# Patient Record
(demographics unavailable — no encounter records)

---

## 2024-11-03 NOTE — ASSESSMENT
[FreeTextEntry1] : 86 y/o F- moderate/severe AS and stage 1 DD, HTN, diverticulitis, total right knee replacement, ovarian Ca 40 yrs ago s/p DAQUAN, osteopenia, BL cataract surgery, recent episode of shingles who presents here to establish care  #Shingles Her rash has resolved and significant pain, s/p course of valtrex - Stop Gabapentin - Stop Motrin - c/w lidocaine patches  #AS Patient with moderate to severe AS on TTE, was following with outside cardiologist, sonia, likely unrelated to her falls - Will plan on referring to cardiology for surveillance and possible TAVR eval - Can discontinue ASA for primary prevention  #HTN On atenelol 25- patient hypertensive and stanislaw to 50s on this visit - medication changed to Losartan 25mg daily and advised to monitor BP at home   #Osteopenia Patient reports she had osteopenia on prior DEXA "Many years ago" - Repeat DEXA - Vit D level  #Hearing/vision issues - Audiology and optometry referrals today  #Falls Seem to be mechanical, dont believe they are cardiac in nature - Educated family on how to implement fall safety measures  - May benefit from assistance devices or PT eval in future - Will attempt to optimize vision and hearing

## 2024-11-03 NOTE — REASON FOR VISIT
[Follow-Up] : a follow-up visit [Family Member] : family member [FreeTextEntry1] : dizziness and elevated blood pressure

## 2024-11-03 NOTE — HISTORY OF PRESENT ILLNESS
[Patient reported osteoporosis screening was abnormal] : Patient reported osteoporosis screening was abnormal [Patient reported hearing was abnormal] : Patient reported hearing was abnormal [Patient reported vision is abnormal] : Patient reported vision is abnormal [Two or more falls in past year] : Patient reported two or more falls in the past year [Patient is independent with] : bathing [] : managing medications [Independent] : managing finances [0] : 2) Feeling down, depressed, or hopeless: Not at all (0) [PHQ-2 Negative - No further assessment needed] : PHQ-2 Negative - No further assessment needed [FreeTextEntry1] : 86 y/o F from home lives alone, accompanied by her daughter Jocelyne [phone]. She has Hx of moderate/severe AS and stage 1 DD, HTN, diverticulitis, total right knee replacement, ovarian Ca 40 yrs ago s/p DAQUAN, osteopenia, BL cataract surgery, recent episode of shingles.  - Accompanied by daughter, Raquel, who states that she has been somewhat more forgetful recently and is still agitated, however is completing all her ADLs still - Patient has had 3 mechanical falls in past year- denies syncope or falls after exertion - Followed with a private cardiologist for AS- mod-severe based off prior TTE in 2023, also with grade 1 DD - Was seen at  one month ago for shingles, has never had it before, given 7 days of Valtrex and topical lidocaine and gabapentin, also taking motrin, states gabapentin makes her dizzy. Her rash has resolved but continues to have lingering pain, she is applying topical calamine. She has not received shingles vaccine in past - Reports issues with vision and hearing. She requests an ophthalmology referral.   - Patient still pretty active, goes on frequent walks and still dances. She also attends senior center at [place].  [Smoke Detector] : no smoke detector [Stair Lift] : no stair lift used in home [Grab Bars] : no grab bars [Shower Chair] : no shower chair [Driving Concerns] : not driving or driving without noted concerns [Juliann] : 9 seconds

## 2024-11-03 NOTE — REVIEW OF SYSTEMS
[Feeling Poorly] : feeling poorly [Feeling Tired] : feeling tired [Skin Lesions] : skin lesion [Recent Weight Gain (___ Lbs)] : no recent weight gain [Recent Weight Loss (___ Lbs)] : no recent weight loss [Chest Pain] : no chest pain [Palpitations] : no palpitations [Lower Ext Edema] : no lower extremity edema [Shortness Of Breath] : no shortness of breath [Wheezing] : no wheezing [Cough] : no cough [SOB on Exertion] : no shortness of breath during exertion [Abdominal Pain] : no abdominal pain [Constipation] : no constipation [Dysuria] : no dysuria [Incontinence] : no incontinence [Dizziness] : no dizziness [Fainting] : no fainting [Difficulty Walking] : no difficulty walking [Anxiety] : no anxiety [Depression] : no depression

## 2024-11-03 NOTE — PHYSICAL EXAM
[Alert] : alert [Sclera] : the sclera and conjunctiva were normal [EOMI] : extraocular movements were intact [Normal Oral Mucosa] : normal oral mucosa [No Oral Cyanosis] : no oral cyanosis [Normal Outer Ear/Nose] : the ears and nose were normal in appearance [Oropharynx] : the oropharynx was normal [Normal Appearance] : the appearance of the neck was normal [Normal] : no respiratory distress, no accessory muscle use, normal respiratory rhythm and effort, lungs were clear to auscultation bilaterally [Normal S1, S2] : normal S1 and S2 [Heart Rate And Rhythm] : heart rate was normal and rhythm regular [Edema] : edema was not present [Bowel Sounds] : normal bowel sounds [Abdomen Tenderness] : non-tender [Abdomen Soft] : soft [No Focal Deficits] : no focal deficits [Oriented To Time, Place, And Person] : oriented to person, place, and time [Normal Mood] : the mood was normal [JVD] : there was jugular-venous distention [de-identified] : BL cerumen impaction  [de-identified] : loud systolic murmur at RUSB [de-identified] : Dermatomal rash with overlying dried flaky ointment over the right neck/shoulder in dermatomal fashion, some hyperalgesia

## 2024-11-03 NOTE — PHYSICAL EXAM
[Alert] : alert [Sclera] : the sclera and conjunctiva were normal [EOMI] : extraocular movements were intact [Normal Oral Mucosa] : normal oral mucosa [No Oral Cyanosis] : no oral cyanosis [Normal Outer Ear/Nose] : the ears and nose were normal in appearance [Oropharynx] : the oropharynx was normal [Normal Appearance] : the appearance of the neck was normal [Normal] : no respiratory distress, no accessory muscle use, normal respiratory rhythm and effort, lungs were clear to auscultation bilaterally [Normal S1, S2] : normal S1 and S2 [Heart Rate And Rhythm] : heart rate was normal and rhythm regular [Edema] : edema was not present [Bowel Sounds] : normal bowel sounds [Abdomen Tenderness] : non-tender [Abdomen Soft] : soft [No Focal Deficits] : no focal deficits [Oriented To Time, Place, And Person] : oriented to person, place, and time [Normal Mood] : the mood was normal [JVD] : there was jugular-venous distention [de-identified] : BL cerumen impaction  [de-identified] : loud systolic murmur at RUSB [de-identified] : Dermatomal rash with overlying dried flaky ointment over the right neck/shoulder in dermatomal fashion, some hyperalgesia

## 2024-11-03 NOTE — HISTORY OF PRESENT ILLNESS
[Patient reported osteoporosis screening was abnormal] : Patient reported osteoporosis screening was abnormal [Patient reported hearing was abnormal] : Patient reported hearing was abnormal [Patient reported vision is abnormal] : Patient reported vision is abnormal [Two or more falls in past year] : Patient reported two or more falls in the past year [Patient is independent with] : bathing [] : managing medications [Independent] : managing finances [0] : 2) Feeling down, depressed, or hopeless: Not at all (0) [PHQ-2 Negative - No further assessment needed] : PHQ-2 Negative - No further assessment needed [FreeTextEntry1] : 88 y/o F from home lives alone, accompanied by her daughter Jocelyne [phone]. She has Hx of moderate/severe AS and stage 1 DD, HTN, diverticulitis, total right knee replacement, ovarian Ca 40 yrs ago s/p DAQUAN, osteopenia, BL cataract surgery, recent episode of shingles.  - Accompanied by daughter, Raquel, who states that she has been somewhat more forgetful recently and is still agitated, however is completing all her ADLs still - Patient has had 3 mechanical falls in past year- denies syncope or falls after exertion - Followed with a private cardiologist for AS- mod-severe based off prior TTE in 2023, also with grade 1 DD - Was seen at  one month ago for shingles, has never had it before, given 7 days of Valtrex and topical lidocaine and gabapentin, also taking motrin, states gabapentin makes her dizzy. Her rash has resolved but continues to have lingering pain, she is applying topical calamine. She has not received shingles vaccine in past - Reports issues with vision and hearing. She requests an ophthalmology referral.   - Patient still pretty active, goes on frequent walks and still dances. She also attends senior center at [place].  [Smoke Detector] : no smoke detector [Stair Lift] : no stair lift used in home [Grab Bars] : no grab bars [Shower Chair] : no shower chair [Driving Concerns] : not driving or driving without noted concerns [Juliann] : 9 seconds

## 2024-11-03 NOTE — ASSESSMENT
[FreeTextEntry1] : 88 y/o F- moderate/severe AS and stage 1 DD, HTN, diverticulitis, total right knee replacement, ovarian Ca 40 yrs ago s/p DAQUAN, osteopenia, BL cataract surgery, recent episode of shingles who presents here to establish care  #Shingles Her rash has resolved and significant pain, s/p course of valtrex - Stop Gabapentin - Stop Motrin - c/w lidocaine patches  #AS Patient with moderate to severe AS on TTE, was following with outside cardiologist, sonia, likely unrelated to her falls - Will plan on referring to cardiology for surveillance and possible TAVR eval - Can discontinue ASA for primary prevention  #HTN On atenelol 25- patient hypertensive and stanislaw to 50s on this visit - medication changed to Losartan 25mg daily and advised to monitor BP at home   #Osteopenia Patient reports she had osteopenia on prior DEXA "Many years ago" - Repeat DEXA - Vit D level  #Hearing/vision issues - Audiology and optometry referrals today  #Falls Seem to be mechanical, dont believe they are cardiac in nature - Educated family on how to implement fall safety measures  - May benefit from assistance devices or PT eval in future - Will attempt to optimize vision and hearing

## 2024-11-29 NOTE — HISTORY OF PRESENT ILLNESS
[Patient reported osteoporosis screening was abnormal] : Patient reported osteoporosis screening was abnormal [Patient reported hearing was abnormal] : Patient reported hearing was abnormal [Patient reported vision is abnormal] : Patient reported vision is abnormal [Two or more falls in past year] : Patient reported two or more falls in the past year [Patient is independent with] : bathing [] : managing medications [Independent] : managing finances [0] : 2) Feeling down, depressed, or hopeless: Not at all (0) [PHQ-2 Negative - No further assessment needed] : PHQ-2 Negative - No further assessment needed [FreeTextEntry1] : 86 y/o F from home lives alone, accompanied by her daughter Jocelyne [phone]. She has Hx of moderate/severe AS and stage 1 DD, HTN, diverticulitis, total right knee replacement, ovarian Ca 40 yrs ago s/p DAQUAN, osteopenia, BL cataract surgery, recent episode of shingles.  - Accompanied by daughter, Raquel, who states that she has been somewhat more forgetful recently and is still agitated, however is completing all her ADLs still - Patient has had 3 mechanical falls in past year- denies syncope or falls after exertion - Followed with a private cardiologist for AS- mod-severe based off prior TTE in 2023, also with grade 1 DD - Was seen at  one month ago for shingles, has never had it before, given 7 days of Valtrex and topical lidocaine and gabapentin, also taking motrin, states gabapentin makes her dizzy. Her rash has resolved but continues to have lingering pain, she is applying topical calamine. She has not received shingles vaccine in past - Reports issues with vision and hearing. She requests an ophthalmology referral.   - Patient still pretty active, goes on frequent walks and still dances. She also attends senior center at [place].  [Smoke Detector] : no smoke detector [Stair Lift] : no stair lift used in home [Grab Bars] : no grab bars [Shower Chair] : no shower chair [Driving Concerns] : not driving or driving without noted concerns [Juliann] : 9 seconds

## 2024-11-29 NOTE — HISTORY OF PRESENT ILLNESS
[Patient reported osteoporosis screening was abnormal] : Patient reported osteoporosis screening was abnormal [Patient reported hearing was abnormal] : Patient reported hearing was abnormal [Patient reported vision is abnormal] : Patient reported vision is abnormal [Two or more falls in past year] : Patient reported two or more falls in the past year [Patient is independent with] : bathing [] : managing medications [Independent] : managing finances [0] : 2) Feeling down, depressed, or hopeless: Not at all (0) [PHQ-2 Negative - No further assessment needed] : PHQ-2 Negative - No further assessment needed [FreeTextEntry1] : 86 y/o F from home lives alone, accompanied by her daughter Jocelyne [phone]. She has Hx of moderate/severe AS and stage 1 DD, HTN, diverticulitis, total right knee replacement, ovarian Ca 40 yrs ago s/p DAQUAN, osteopenia, BL cataract surgery, recent episode of shingles.  - Accompanied by daughter, Raquel, who states that she has been somewhat more forgetful recently and is still agitated, however is completing all her ADLs still - Patient has had 3 mechanical falls in past year- denies syncope or falls after exertion - Followed with a private cardiologist for AS- mod-severe based off prior TTE in 2023, also with grade 1 DD - Was seen at  one month ago for shingles, has never had it before, given 7 days of Valtrex and topical lidocaine and gabapentin, also taking motrin, states gabapentin makes her dizzy. Her rash has resolved but continues to have lingering pain, she is applying topical calamine. She has not received shingles vaccine in past - Reports issues with vision and hearing. She requests an ophthalmology referral.   - Patient still pretty active, goes on frequent walks and still dances. She also attends senior center at [place].  [Smoke Detector] : no smoke detector [Stair Lift] : no stair lift used in home [Grab Bars] : no grab bars [Shower Chair] : no shower chair [Driving Concerns] : not driving or driving without noted concerns [Juliann] : 9 seconds

## 2024-11-29 NOTE — PHYSICAL EXAM
[Alert] : alert [Sclera] : the sclera and conjunctiva were normal [EOMI] : extraocular movements were intact [Normal Oral Mucosa] : normal oral mucosa [No Oral Cyanosis] : no oral cyanosis [Normal Outer Ear/Nose] : the ears and nose were normal in appearance [Oropharynx] : the oropharynx was normal [Normal Appearance] : the appearance of the neck was normal [Normal] : no respiratory distress, no accessory muscle use, normal respiratory rhythm and effort, lungs were clear to auscultation bilaterally [Normal S1, S2] : normal S1 and S2 [Heart Rate And Rhythm] : heart rate was normal and rhythm regular [Edema] : edema was not present [Bowel Sounds] : normal bowel sounds [Abdomen Tenderness] : non-tender [Abdomen Soft] : soft [No Focal Deficits] : no focal deficits [Oriented To Time, Place, And Person] : oriented to person, place, and time [Normal Mood] : the mood was normal [JVD] : there was jugular-venous distention [de-identified] : BL cerumen impaction  [de-identified] : loud systolic murmur at RUSB [de-identified] : Dermatomal rash with overlying dried flaky ointment over the right neck/shoulder in dermatomal fashion, some hyperalgesia

## 2024-11-29 NOTE — PHYSICAL EXAM
[Alert] : alert [Sclera] : the sclera and conjunctiva were normal [EOMI] : extraocular movements were intact [Normal Oral Mucosa] : normal oral mucosa [No Oral Cyanosis] : no oral cyanosis [Normal Outer Ear/Nose] : the ears and nose were normal in appearance [Oropharynx] : the oropharynx was normal [Normal Appearance] : the appearance of the neck was normal [Normal] : no respiratory distress, no accessory muscle use, normal respiratory rhythm and effort, lungs were clear to auscultation bilaterally [Normal S1, S2] : normal S1 and S2 [Heart Rate And Rhythm] : heart rate was normal and rhythm regular [Edema] : edema was not present [Bowel Sounds] : normal bowel sounds [Abdomen Tenderness] : non-tender [Abdomen Soft] : soft [No Focal Deficits] : no focal deficits [Oriented To Time, Place, And Person] : oriented to person, place, and time [Normal Mood] : the mood was normal [JVD] : there was jugular-venous distention [de-identified] : BL cerumen impaction  [de-identified] : loud systolic murmur at RUSB [de-identified] : Dermatomal rash with overlying dried flaky ointment over the right neck/shoulder in dermatomal fashion, some hyperalgesia

## 2024-11-29 NOTE — REASON FOR VISIT
[Follow-Up] : a follow-up visit [Family Member] : family member [FreeTextEntry1] : dizziness and elevated blood pressure Yes

## 2024-11-29 NOTE — HISTORY OF PRESENT ILLNESS
[Patient reported osteoporosis screening was abnormal] : Patient reported osteoporosis screening was abnormal [Patient reported hearing was abnormal] : Patient reported hearing was abnormal [Patient reported vision is abnormal] : Patient reported vision is abnormal [Two or more falls in past year] : Patient reported two or more falls in the past year [Patient is independent with] : bathing [] : managing medications [Independent] : managing finances [0] : 2) Feeling down, depressed, or hopeless: Not at all (0) [PHQ-2 Negative - No further assessment needed] : PHQ-2 Negative - No further assessment needed [FreeTextEntry1] : 88 y/o F from home lives alone, accompanied by her daughter Jocelyne [phone]. She has Hx of moderate/severe AS and stage 1 DD, HTN, diverticulitis, total right knee replacement, ovarian Ca 40 yrs ago s/p DAQUAN, osteopenia, BL cataract surgery, recent episode of shingles.  - Accompanied by daughter, Raquel, who states that she has been somewhat more forgetful recently and is still agitated, however is completing all her ADLs still - Patient has had 3 mechanical falls in past year- denies syncope or falls after exertion - Followed with a private cardiologist for AS- mod-severe based off prior TTE in 2023, also with grade 1 DD - Was seen at  one month ago for shingles, has never had it before, given 7 days of Valtrex and topical lidocaine and gabapentin, also taking motrin, states gabapentin makes her dizzy. Her rash has resolved but continues to have lingering pain, she is applying topical calamine. She has not received shingles vaccine in past - Reports issues with vision and hearing. She requests an ophthalmology referral.   - Patient still pretty active, goes on frequent walks and still dances. She also attends senior center at [place].  [Smoke Detector] : no smoke detector [Stair Lift] : no stair lift used in home [Grab Bars] : no grab bars [Shower Chair] : no shower chair [Driving Concerns] : not driving or driving without noted concerns [Juliann] : 9 seconds

## 2024-11-29 NOTE — PHYSICAL EXAM
[Alert] : alert [Sclera] : the sclera and conjunctiva were normal [EOMI] : extraocular movements were intact [Normal Oral Mucosa] : normal oral mucosa [No Oral Cyanosis] : no oral cyanosis [Normal Outer Ear/Nose] : the ears and nose were normal in appearance [Oropharynx] : the oropharynx was normal [Normal Appearance] : the appearance of the neck was normal [Normal] : no respiratory distress, no accessory muscle use, normal respiratory rhythm and effort, lungs were clear to auscultation bilaterally [Normal S1, S2] : normal S1 and S2 [Heart Rate And Rhythm] : heart rate was normal and rhythm regular [Edema] : edema was not present [Bowel Sounds] : normal bowel sounds [Abdomen Tenderness] : non-tender [Abdomen Soft] : soft [No Focal Deficits] : no focal deficits [Oriented To Time, Place, And Person] : oriented to person, place, and time [Normal Mood] : the mood was normal [JVD] : there was jugular-venous distention [de-identified] : BL cerumen impaction  [de-identified] : loud systolic murmur at RUSB [de-identified] : Dermatomal rash with overlying dried flaky ointment over the right neck/shoulder in dermatomal fashion, some hyperalgesia

## 2025-03-18 NOTE — HISTORY OF PRESENT ILLNESS
[FreeTextEntry1] : Care team: Cardiologist Dr. David Feliciano   Ms. ROSCOE LOPEZ is a 87 year old woman with pmhx of  of moderate/severe AS and stage 1 DD, HTN, diverticulitis, total right knee replacement, ovarian Ca 40 yrs ago s/p DAQUAN, hx of falls, osteopenia w/ L1 compression fracture, BL cataract surgery, hx of shingles / residual pain who presents for a follow up.   Postherpetic neuralgia has responded well to Tylenol instead and using lidocaine patch.  She is using sublingual B12 supplements.   Reviewed pet imaging and labs, L1 compression deformity appreciated on CT.  Discussed having bone density testing again, she had normal vitamin D levels in July. Dexa referral provided.   She has history of aortic stenosis, denied any chest pain syncope and shortness of breath.  Discussed risk and benefits of TAVR, including risk of stroke with the procedure.  c/w statin. she wants to see cardiologist close to her house.   Her blood pressure has been elevated.  She tried losartan but endorses she had an adverse reaction leading to coughing.  She is willing to try another medication.  Discussed trial of amlodipine 5 mg daily.  She will return in 3 months.   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  Ambulates independently and is active.  attends senior center at [place].  [Patient reported osteoporosis screening was abnormal] : Patient reported osteoporosis screening was abnormal [Patient reported hearing was abnormal] : Patient reported hearing was abnormal [Patient reported vision is abnormal] : Patient reported vision is abnormal [Two or more falls in past year] : Patient reported two or more falls in the past year [Patient is independent with] : bathing [] : managing medications [Independent] : managing finances [Smoke Detector] : no smoke detector [Stair Lift] : no stair lift used in home [Grab Bars] : no grab bars [Shower Chair] : no shower chair [Driving Concerns] : not driving or driving without noted concerns [Juliann] : 9 seconds  [0] : 2) Feeling down, depressed, or hopeless: Not at all (0) [PHQ-2 Negative - No further assessment needed] : PHQ-2 Negative - No further assessment needed

## 2025-03-18 NOTE — REVIEW OF SYSTEMS
[Feeling Poorly] : feeling poorly [Feeling Tired] : feeling tired [Recent Weight Gain (___ Lbs)] : no recent weight gain [Recent Weight Loss (___ Lbs)] : no recent weight loss [Chest Pain] : no chest pain [Palpitations] : no palpitations [Lower Ext Edema] : no lower extremity edema [Shortness Of Breath] : no shortness of breath [Wheezing] : no wheezing [Cough] : no cough [SOB on Exertion] : no shortness of breath during exertion [Abdominal Pain] : no abdominal pain [Constipation] : no constipation [Dysuria] : no dysuria [Incontinence] : no incontinence [Skin Lesions] : skin lesion [Dizziness] : no dizziness [Fainting] : no fainting [Difficulty Walking] : no difficulty walking [Anxiety] : no anxiety [Depression] : no depression

## 2025-03-18 NOTE — ASSESSMENT
[FreeTextEntry1] : #HTN -Elevated blood pressure appreciated today likely situational from traffic. AE to losartan 25 mg daily. C/w amlodipine 5mg daily.   #Shingles w/ PHN - Stop Gabapentin due to falls. - Stop Motrin - c/w lidocaine patches and adding acetaminophen prn for pain   #AS Patient with moderate to severe AS on TTE, was following with outside cardiologist, sonia, likely unrelated to her falls - Cardiology note reviewed, advised follow up. Cardiologist information provided for [facility].  - Had discontinued ASA for primary prevention, lipoprotein a levels wnl.  - discussed 5 3 2 rule, she denied symptoms.   #Osteopenia Patient reports she had osteopenia on prior DEXA "Many years ago" . ct imaging with possible compression deformity  - Repeat DEXA ordered again - Vit D level wnl.   #Hearing/vision issues - Audiology and optometry referrals provided in the past.   #Falls - c/w PT/OT referral, she was not able to go to center often. advised pt at home.   Orders and referrals provided as below. All patient questions answered today and understood by patient. Henceforth, Patient to schedule follow up in 3 months for repeat blood work and bp monitoring. sooner if new symptoms, questions, renewals or health concerns.

## 2025-03-18 NOTE — PHYSICAL EXAM
[Alert] : alert [Sclera] : the sclera and conjunctiva were normal [EOMI] : extraocular movements were intact [Normal Oral Mucosa] : normal oral mucosa [No Oral Cyanosis] : no oral cyanosis [Normal Outer Ear/Nose] : the ears and nose were normal in appearance [Oropharynx] : the oropharynx was normal [Normal Appearance] : the appearance of the neck was normal [JVD] : there was jugular-venous distention [Normal] : no respiratory distress, no accessory muscle use, normal respiratory rhythm and effort, lungs were clear to auscultation bilaterally [Normal S1, S2] : normal S1 and S2 [Heart Rate And Rhythm] : heart rate was normal and rhythm regular [Edema] : edema was not present [Bowel Sounds] : normal bowel sounds [Abdomen Tenderness] : non-tender [Abdomen Soft] : soft [No Focal Deficits] : no focal deficits [Oriented To Time, Place, And Person] : oriented to person, place, and time [Normal Mood] : the mood was normal [de-identified] : BL cerumen impaction  [de-identified] : loud systolic murmur at RUSB [de-identified] : Dermatomal rash with overlying dried flaky ointment over the right neck/shoulder in dermatomal fashion, some hyperalgesia

## 2025-05-14 NOTE — HISTORY OF PRESENT ILLNESS
[Patient reported osteoporosis screening was abnormal] : Patient reported osteoporosis screening was abnormal [Patient reported hearing was abnormal] : Patient reported hearing was abnormal [Patient reported vision is abnormal] : Patient reported vision is abnormal [Two or more falls in past year] : Patient reported two or more falls in the past year [Patient is independent with] : bathing [] : managing medications [Independent] : managing finances [0] : 2) Feeling down, depressed, or hopeless: Not at all (0) [PHQ-2 Negative - No further assessment needed] : PHQ-2 Negative - No further assessment needed [FreeTextEntry1] : Care team: Cardiologist Dr. David Feliciano   Ms. ROSCOE LOPEZ is a 87 year old woman with pmhx of  of moderate/severe AS and stage 1 DD, HTN, diverticulitis, total right knee replacement, ovarian Ca 40 yrs ago s/p DAQUAN, hx of falls, osteopenia w/ L1 compression fracture, BL cataract surgery, hx of shingles / residual pain, memory changes who presents for a follow up. She is accompanied by daughter who helps provide history.   She complains of pain in the back of the ankle for the past week. she goes out dancing. she has point tenderness in the calcaneal area where the achiles tendon inserts. we discussed ice, rest and anti inflammatories. trial of PT afterward and having mri if no improvement.   She has chronic Postherpetic neuralgia, responded well to lidocaine patch.  She is using vitamins supplementation.   Reviewed pet imaging and labs, L1 compression deformity appreciated on CT.  Discussed having bone density testing again, she had normal vitamin D levels in July. Dexa referral provided to patient during appt in the past.   She has history of aortic stenosis, denied any chest pain syncope and shortness of breath.  Discussed risk and benefits of TAVR, including risk of stroke with the procedure.  c/w statin. Follow up with cardiologist for repeat echo.   She is due for labs.   Family is concerns for memory changes. She has excessive ear wax, advised seeing ENT and having hearing evlauation. also see ophtalmologist.   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  Ambulates independently and is active.  attends senior center at [place].  [Smoke Detector] : no smoke detector [Stair Lift] : no stair lift used in home [Grab Bars] : no grab bars [Shower Chair] : no shower chair [Driving Concerns] : not driving or driving without noted concerns [Juliann] : 9 seconds

## 2025-05-14 NOTE — HISTORY OF PRESENT ILLNESS
[Patient reported osteoporosis screening was abnormal] : Patient reported osteoporosis screening was abnormal [Patient reported hearing was abnormal] : Patient reported hearing was abnormal [Patient reported vision is abnormal] : Patient reported vision is abnormal [Two or more falls in past year] : Patient reported two or more falls in the past year [Patient is independent with] : bathing [] : managing medications [Independent] : managing finances [0] : 2) Feeling down, depressed, or hopeless: Not at all (0) [PHQ-2 Negative - No further assessment needed] : PHQ-2 Negative - No further assessment needed [FreeTextEntry1] : Care team: Cardiologist Dr. David Feliciano   Ms. ROSCOE LOPEZ is a 87 year old woman with pmhx of  of moderate/severe AS and stage 1 DD, HTN, diverticulitis, total right knee replacement, ovarian Ca 40 yrs ago s/p DAQUAN, hx of falls, osteopenia w/ L1 compression fracture, BL cataract surgery, hx of shingles / residual pain, memory changes who presents for a follow up. She is accompanied by daughter who helps provide history.   She complains of pain in the back of the ankle for the past week. she goes out dancing. she has point tenderness in the calcaneal area where the achiles tendon inserts. we discussed ice, rest and anti inflammatories. trial of PT afterward and having mri if no improvement.   She has chronic Postherpetic neuralgia, responded well to lidocaine patch.  She is using vitamins supplementation.   Reviewed pet imaging and labs, L1 compression deformity appreciated on CT.  Discussed having bone density testing again, she had normal vitamin D levels in July. Dexa referral provided to patient during appt in the past.   She has history of aortic stenosis, denied any chest pain syncope and shortness of breath.  Discussed risk and benefits of TAVR, including risk of stroke with the procedure.  c/w statin. Follow up with cardiologist for repeat echo.   She is due for labs.   Family is concerns for memory changes. She has excessive ear wax, advised seeing ENT and having hearing evlauation. also see ophtalmologist.   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  Ambulates independently and is active.  attends senior center at [employer].  [Smoke Detector] : no smoke detector [Stair Lift] : no stair lift used in home [Grab Bars] : no grab bars [Shower Chair] : no shower chair [Driving Concerns] : not driving or driving without noted concerns [Juliann] : 9 seconds

## 2025-05-14 NOTE — PHYSICAL EXAM
[Alert] : alert [Sclera] : the sclera and conjunctiva were normal [EOMI] : extraocular movements were intact [Normal Oral Mucosa] : normal oral mucosa [No Oral Cyanosis] : no oral cyanosis [Normal Outer Ear/Nose] : the ears and nose were normal in appearance [Oropharynx] : the oropharynx was normal [Normal Appearance] : the appearance of the neck was normal [Normal] : no respiratory distress, no accessory muscle use, normal respiratory rhythm and effort, lungs were clear to auscultation bilaterally [Normal S1, S2] : normal S1 and S2 [Heart Rate And Rhythm] : heart rate was normal and rhythm regular [Edema] : edema was not present [Bowel Sounds] : normal bowel sounds [Abdomen Tenderness] : non-tender [Abdomen Soft] : soft [No Focal Deficits] : no focal deficits [Oriented To Time, Place, And Person] : oriented to person, place, and time [Normal Mood] : the mood was normal [JVD] : there was jugular-venous distention [de-identified] : BL cerumen impaction  [de-identified] : loud systolic murmur at RUSB [de-identified] : Dermatomal rash with overlying dried flaky ointment over the right neck/shoulder in dermatomal fashion, some hyperalgesia

## 2025-05-14 NOTE — ASSESSMENT
[FreeTextEntry1] : #HTN -AE to losartan 25 mg in the past.  Now stable, C/w amlodipine 5mg daily.   #Shingles w/ PHN - Stop Gabapentin due to falls. - Stop Motrin - c/w lidocaine patches and adding acetaminophen prn for pain   #AS Patient with moderate to severe AS on TTE, was following with outside cardiologist, asx, likely unrelated to her falls - Cardiology note reviewed, advised follow up.  - Had discontinued ASA for primary prevention, lipoprotein a levels wnl.  - pt denied symptoms. tte with cardiologist.   #Osteopenia Patient reports she had osteopenia on prior DEXA "Many years ago" . ct imaging with possible compression deformity  - Repeat DEXA ordered in the past, patient did not do it.  - Vit D level wnl.   #Hearing/vision issues - Excessive ear wax, unable to remove in the office. debrox sent to pharmacy. Audiology and optometry referrals provided in the past.   #Falls - c/w PT/OT referral, she was not able to go to center often. advised pt at home.   Hm- Orders and referrals provided as below. All patient questions answered today and understood by patient. Henceforth, Patient to schedule follow up in 3 months for repeat blood work and bp monitoring. sooner if new symptoms, questions, renewals or health concerns.

## 2025-05-14 NOTE — PHYSICAL EXAM
[Alert] : alert [Sclera] : the sclera and conjunctiva were normal [EOMI] : extraocular movements were intact [Normal Oral Mucosa] : normal oral mucosa [No Oral Cyanosis] : no oral cyanosis [Normal Outer Ear/Nose] : the ears and nose were normal in appearance [Oropharynx] : the oropharynx was normal [Normal Appearance] : the appearance of the neck was normal [Normal] : no respiratory distress, no accessory muscle use, normal respiratory rhythm and effort, lungs were clear to auscultation bilaterally [Normal S1, S2] : normal S1 and S2 [Heart Rate And Rhythm] : heart rate was normal and rhythm regular [Edema] : edema was not present [Bowel Sounds] : normal bowel sounds [Abdomen Tenderness] : non-tender [Abdomen Soft] : soft [No Focal Deficits] : no focal deficits [Oriented To Time, Place, And Person] : oriented to person, place, and time [Normal Mood] : the mood was normal [JVD] : there was jugular-venous distention [de-identified] : BL cerumen impaction  [de-identified] : loud systolic murmur at RUSB [de-identified] : Dermatomal rash with overlying dried flaky ointment over the right neck/shoulder in dermatomal fashion, some hyperalgesia